# Patient Record
Sex: FEMALE | URBAN - METROPOLITAN AREA
[De-identification: names, ages, dates, MRNs, and addresses within clinical notes are randomized per-mention and may not be internally consistent; named-entity substitution may affect disease eponyms.]

---

## 2023-04-20 ENCOUNTER — APPOINTMENT (OUTPATIENT)
Dept: URBAN - METROPOLITAN AREA CLINIC 197 | Age: 43
Setting detail: DERMATOLOGY
End: 2023-04-25

## 2023-04-20 DIAGNOSIS — L98.8 OTHER SPECIFIED DISORDERS OF THE SKIN AND SUBCUTANEOUS TISSUE: ICD-10-CM

## 2023-04-20 DIAGNOSIS — Z41.9 ENCOUNTER FOR PROCEDURE FOR PURPOSES OTHER THAN REMEDYING HEALTH STATE, UNSPECIFIED: ICD-10-CM

## 2023-04-20 PROCEDURE — OTHER OTHER (COSMETIC): OTHER

## 2023-04-20 PROCEDURE — OTHER JEUVEAU (U OR CC): OTHER

## 2023-04-20 PROCEDURE — OTHER COUNSELING: OTHER

## 2023-04-20 PROCEDURE — OTHER JEUVEAU (U OR CC) ADDITIVE: OTHER

## 2023-04-20 PROCEDURE — OTHER COSMETIC CONSULTATION: FILLERS: OTHER

## 2023-04-20 ASSESSMENT — LOCATION DETAILED DESCRIPTION DERM
LOCATION DETAILED: PROCERUS
LOCATION DETAILED: RIGHT SUPERIOR VERMILION LIP
LOCATION DETAILED: RIGHT FRONTALIS
LOCATION DETAILED: LEFT CORRUGATOR SUPERCILIARIS
LOCATION DETAILED: LEFT FRONTALIS
LOCATION DETAILED: LEFT INFERIOR VERMILION LIP
LOCATION DETAILED: RIGHT CORRUGATOR SUPERCILIARIS

## 2023-04-20 ASSESSMENT — LOCATION SIMPLE DESCRIPTION DERM
LOCATION SIMPLE: LEFT LIP
LOCATION SIMPLE: RIGHT FRONTALIS
LOCATION SIMPLE: RIGHT CORRUGATOR SUPERCILIARIS
LOCATION SIMPLE: LEFT FRONTALIS
LOCATION SIMPLE: RIGHT LIP
LOCATION SIMPLE: LEFT CORRUGATOR SUPERCILIARIS
LOCATION SIMPLE: PROCERUS

## 2023-04-20 ASSESSMENT — LOCATION ZONE DERM
LOCATION ZONE: LIP
LOCATION ZONE: FACE

## 2023-07-28 ENCOUNTER — APPOINTMENT (OUTPATIENT)
Dept: URBAN - METROPOLITAN AREA CLINIC 197 | Age: 43
Setting detail: DERMATOLOGY
End: 2023-08-01

## 2023-07-28 DIAGNOSIS — Z41.9 ENCOUNTER FOR PROCEDURE FOR PURPOSES OTHER THAN REMEDYING HEALTH STATE, UNSPECIFIED: ICD-10-CM

## 2023-07-28 PROCEDURE — OTHER JEUVEAU (U OR CC): OTHER

## 2023-07-28 PROCEDURE — OTHER COUNSELING: OTHER

## 2023-07-28 PROCEDURE — OTHER OTHER (COSMETIC): OTHER

## 2023-07-28 NOTE — PROCEDURE: JEUVEAU (U OR CC)
Price (Use Numbers Only, No Special Characters Or $): 370 Price (Use Numbers Only, No Special Characters Or $): 097

## 2023-09-20 ENCOUNTER — APPOINTMENT (OUTPATIENT)
Dept: URBAN - METROPOLITAN AREA CLINIC 197 | Age: 43
Setting detail: DERMATOLOGY
End: 2023-09-25

## 2023-09-20 DIAGNOSIS — Z41.9 ENCOUNTER FOR PROCEDURE FOR PURPOSES OTHER THAN REMEDYING HEALTH STATE, UNSPECIFIED: ICD-10-CM

## 2023-09-20 PROCEDURE — OTHER JEUVEAU (U OR CC): OTHER

## 2023-09-20 PROCEDURE — OTHER COUNSELING: OTHER

## 2023-09-20 PROCEDURE — OTHER OTHER (COSMETIC): OTHER

## 2023-09-20 NOTE — PROCEDURE: JEUVEAU (U OR CC)
Price (Use Numbers Only, No Special Characters Or $): 553 Price (Use Numbers Only, No Special Characters Or $): 305

## 2024-03-12 ENCOUNTER — APPOINTMENT (OUTPATIENT)
Dept: URBAN - METROPOLITAN AREA CLINIC 197 | Age: 44
Setting detail: DERMATOLOGY
End: 2024-04-02

## 2024-03-12 DIAGNOSIS — Z41.9 ENCOUNTER FOR PROCEDURE FOR PURPOSES OTHER THAN REMEDYING HEALTH STATE, UNSPECIFIED: ICD-10-CM

## 2024-03-12 PROCEDURE — OTHER COUNSELING: OTHER

## 2024-03-12 PROCEDURE — OTHER OTHER (COSMETIC): OTHER

## 2024-03-12 PROCEDURE — OTHER JEUVEAU (U OR CC): OTHER

## 2024-03-12 NOTE — PROCEDURE: JEUVEAU (U OR CC)
Nasal Root Units/Cc: 0
Post-Care Instructions: -Avoid touching or physical pressure on the treated area.\\n-Expect swelling and bruising. Apply a pack of ice for relief using light pressure only.\\n-For the first four hours, do not lay down.\\n-Do not drink alcohol or use NSAIDs for 24 hours following your treatment to reduce risk of additional bruising.\\n-Avoid sun exposure, tanning beds, saunas, and hot tubs for 24 hours.\\n-Avoid workouts or any activities raising the blood pressure or hard rate for the first 24 hours.\\n- Neuromodulators can take 3-5 days to take affect and up to 2 weeks to be at its \"final\" stage. RD the 14th day. Encouraged to schedule two week appointment for any refinements.\\n\\nContact the office for concerns. Reviewed for emergencies, go to your local emergency department.\\n\\nPaper post care instructions were given to patient prior to leaving office.\\n\\nPatient tolerated procedure well with no immediate adverse effects. Patient left the office AOx3.\\n\\nEncouraged to contact the office c any questions.
Periorbital Skin Units/Cc: 16
Document As Units Or Cc?: units
Detail Level: Detailed
Additional Area 1 Units: 4
Additional Area 3 Location: ergotrid
Price (Use Numbers Only, No Special Characters Or $): 987
Additional Area 2 Location: lip flip
Forehead Units/Cc: 10
Consent: Written consent obtained. Risks include but not limited hypersensitivity reaction, lid/brow ptosis, bruising, swelling, diplopia, temporary effect, incomplete chemical denervation.\\n\\nBotulinum toxin and neuromodulator/neurotoxin treatment were discussed in detail with the patient. Including risks, benefits, expected outcomes, MOA, and overall patient goals. Pt verbalized understanding throughout the discussion and procedure and was encouraged to ask any questions.\\n\\n-\\nProcedure Details:\\n\\nPlease see attached map for locations, injection amounts, and treatment notes/memorandums.\\n\\nArea was prepared with 70% isopropyl alcohol and hypochlorous acid (Puracyn). \\n\\nScanned medical history, medication reconciliation, know allergies, and past treatments (if applicable) as well as photo consent were reviewed with patient prior to procedure.\\n\\nPt advised that should photo consent for marketing/publication be declined, photos will still be obtained a securely stored in their EHR records.
Glabellar Complex Units: 12
Expiration Date (Month Year): 11/2025
Including Pricing Information In The Note: No
Additional Area 2 Units: 2
Lot #: [id number]

## 2025-02-26 ENCOUNTER — APPOINTMENT (OUTPATIENT)
Dept: URBAN - METROPOLITAN AREA CLINIC 197 | Age: 45
Setting detail: DERMATOLOGY
End: 2025-02-27

## 2025-02-26 DIAGNOSIS — Z41.9 ENCOUNTER FOR PROCEDURE FOR PURPOSES OTHER THAN REMEDYING HEALTH STATE, UNSPECIFIED: ICD-10-CM

## 2025-02-26 PROCEDURE — OTHER OTHER (COSMETIC): OTHER

## 2025-02-26 PROCEDURE — OTHER COUNSELING: OTHER

## 2025-02-26 PROCEDURE — OTHER JEUVEAU (U OR CC): OTHER

## 2025-02-26 NOTE — PROCEDURE: JEUVEAU (U OR CC)
Nasal Root Units/Cc: 0
Post-Care Instructions: -Avoid touching or physical pressure on the treated area.\\n-Expect swelling and bruising. Apply a pack of ice for relief using light pressure only.\\n-For the first four hours, do not lay down.\\n-Do not drink alcohol or use NSAIDs for 24 hours following your treatment to reduce risk of additional bruising.\\n-Avoid sun exposure, tanning beds, saunas, and hot tubs for 24 hours.\\n-Avoid workouts or any activities raising the blood pressure or hard rate for the first 24 hours.\\n- Neuromodulators can take 3-5 days to take affect and up to 2 weeks to be at its \"final\" stage. RD the 14th day. Encouraged to schedule two week appointment for any refinements.\\n\\nContact the office for concerns. Reviewed for emergencies, go to your local emergency department.\\n\\nPaper post care instructions were given to patient prior to leaving office.\\n\\nPatient tolerated procedure well with no immediate adverse effects. Patient left the office AOx3.\\n\\nEncouraged to contact the office c any questions.
Periorbital Skin Units/Cc: 16
Document As Units Or Cc?: units
Detail Level: Detailed
Price (Use Numbers Only, No Special Characters Or $): 515
Additional Area 2 Location: lip flip
Forehead Units/Cc: 12
Consent: Written consent obtained. Risks include but not limited hypersensitivity reaction, lid/brow ptosis, bruising, swelling, diplopia, temporary effect, incomplete chemical denervation.\\n\\nBotulinum toxin and neuromodulator/neurotoxin treatment were discussed in detail with the patient. Including risks, benefits, expected outcomes, MOA, and overall patient goals. Pt verbalized understanding throughout the discussion and procedure and was encouraged to ask any questions.\\n\\n-\\nProcedure Details:\\n\\nPlease see attached map for locations, injection amounts, and treatment notes/memorandums.\\n\\nArea was prepared with 70% isopropyl alcohol and hypochlorous acid (Puracyn). \\n\\nScanned medical history, medication reconciliation, know allergies, and past treatments (if applicable) as well as photo consent were reviewed with patient prior to procedure.\\n\\nPt advised that should photo consent for marketing/publication be declined, photos will still be obtained a securely stored in their EHR records.
Expiration Date (Month Year): 11/2025
Including Pricing Information In The Note: No
Additional Area 2 Units: 4
Lot #: [id number]